# Patient Record
Sex: MALE | Race: WHITE | URBAN - METROPOLITAN AREA
[De-identification: names, ages, dates, MRNs, and addresses within clinical notes are randomized per-mention and may not be internally consistent; named-entity substitution may affect disease eponyms.]

---

## 2017-12-26 ENCOUNTER — OFFICE VISIT (OUTPATIENT)
Dept: URGENT CARE | Facility: URGENT CARE | Age: 57
End: 2017-12-26
Payer: COMMERCIAL

## 2017-12-26 DIAGNOSIS — R07.89 CHEST TIGHTNESS: Primary | ICD-10-CM

## 2017-12-26 DIAGNOSIS — I44.0 AV BLOCK, 1ST DEGREE: ICD-10-CM

## 2017-12-26 PROCEDURE — 93000 ELECTROCARDIOGRAM COMPLETE: CPT | Performed by: NURSE PRACTITIONER

## 2017-12-26 PROCEDURE — 99204 OFFICE O/P NEW MOD 45 MIN: CPT | Performed by: NURSE PRACTITIONER

## 2017-12-26 NOTE — MR AVS SNAPSHOT
After Visit Summary   12/26/2017    Osvaldo Mallory    MRN: [id number]           Patient Information     Date Of Birth          1960        Visit Information        Provider Department      12/26/2017 11:55 AM Gayle Sanchez NP [facility]        Today's Diagnoses     Chest tightness    -  1    AV block, 1st degree          Care Instructions      Understanding First-Degree Heart Block    Heart block is a condition in which the electrical system of the heart does not work properly. Sometimes it can result in a slow heartbeat that is either regular or irregular. This may cause symptoms. There are different types of heart block that can be more or less serious. First-degree heart block is a condition in which the wiring of the heart is slow to send electrical signals but all of the signals are able to pass successfully. There is no electrical block but rather a slowing or delay of the signal. It usually does not cause problems. Often it does not need treatment.  What causes first-degree heart block?  First-degree heart block may be caused by:    Natural aging process    Damage to the heart from surgery    Damage to the heart muscle from a heart attack    Other types of heart disease that damage the heart muscle    Low thyroid levels    Electrolyte abnormalities    Inflammatory or infectious heart conditions    Other diseases, including rheumatic fever and sarcoidosis    Some medicines  In addition, well-conditioned athletes may develop first-degree heart block from heart changes that result from exercising a lot. This is considered normal. Some babies are born with heart block. Heart block may also run in families.  What are the symptoms of first-degree heart block?  First-degree heart block often does not have any symptoms. It may be found when your healthcare provider is examining you for some other reason.  In more severe cases, people may have an uncomfortable awareness of the  heartbeat.   How is first-degree heart block treated?  First-degree heart block usually doesn t need treatment. Your healthcare provider may ask you to have regular follow-up visits. You may also be asked to take your own pulse and be alert to changes in your heart rate.  What are the  complications of first-degree heart block?  In rare instances, a first-degree heart block may develop into a more serious type of heart block that results in slower heartbeats. This may cause symptoms.  When should I call my healthcare provider?  Call your healthcare provider right away if you have any of these:    Unusual tiredness    Shortness of breath    Chest pain    Weakness, dizziness, or fainting    Unusual drowsiness or confusion    Pain that gets worse    Symptoms that don t get better with treatment, or symptoms that get worse    New symptoms   Date Last Reviewed: 5/1/2016 2000-2017 [employer]. [address] [place] [address]. All rights reserved. This information is not intended as a substitute for professional medical care. Always follow your healthcare professional's instructions.                Follow-ups after your visit        Additional Services     CARDIOLOGY EVAL ADULT REFERRAL       Your provider has referred you to:  [facility]: [facility] [phone]   [email or web address]    Please be aware that coverage of these services is subject to the terms and limitations of your health insurance plan.  Call member services at your health plan with any benefit or coverage questions.      Type of Referral:  New Cardiology Consult    Timeframe requested:  1-3 Days    Please bring the following to your appointment:  >>   Any x-rays, CTs or MRIs which have been performed.  Contact the facility where they were done to arrange for  prior to your scheduled appointment.   "  >>   List of current medications  >>   This referral request   >>   Any documents/labs given to you for this referral                  Who to contact     If you have questions or need follow up information about today's clinic visit or your schedule please contact [facility] [place] directly at [phone].  Normal or non-critical lab and imaging results will be communicated to you by MyChart, letter or phone within 4 business days after the clinic has received the results. If you do not hear from us within 7 days, please contact the clinic through MyChart or phone. If you have a critical or abnormal lab result, we will notify you by phone as soon as possible.  Submit refill requests through [employer] or call your pharmacy and they will forward the refill request to us. Please allow 3 business days for your refill to be completed.          Additional Information About Your Visit        MyChart Information     [employer] lets you send messages to your doctor, view your test results, renew your prescriptions, schedule appointments and more. To sign up, go to [email or web address] . Click on \"Log in\" on the left side of the screen, which will take you to the Welcome page. Then click on \"Sign up Now\" on the right side of the page.     You will be asked to enter the access code listed below, as well as some personal information. Please follow the directions to create your username and password.     Your access code is: [email or web address]  Expires: 3/26/2018  1:05 PM     Your access code will  in 90 days. If you need help or a new code, please call your [place] clinic or [phone].        Care EveryWhere ID     This is your Care EveryWhere ID. This could be used by other organizations to access your [place] medical records  [id number]         Blood Pressure from Last 3 Encounters:   No data found for BP    Weight from Last 3 Encounters:   No data found for Wt              We Performed the Following     " CARDIOLOGY EVAL ADULT REFERRAL     EKG 12-lead complete w/read - Clinics        Primary Care Provider Fax #    Physician No Ref-Primary [phone]       No address on file        Equal Access to Services     MARIBELL PEREIRA : Hadenrique aad ku haddestinyo Sokaylynali, wamansida luqadaha, qamichelleta kaalmada abel, lizeth venegas laángeljulianne mary ellen. So [facility] [phone].    ATENCIÓN: Si habla español, tiene a beaulieu disposición servicios gratuitos de asistencia lingüística. Llame al [phone].    We comply with applicable federal civil rights laws and [place] laws. We do not discriminate on the basis of race, color, national origin, age, disability, sex, sexual orientation, or gender identity.            Thank you!     Thank you for choosing [facility]  for your care. Our goal is always to provide you with excellent care. Hearing back from our patients is one way we can continue to improve our services. Please take a few minutes to complete the written survey that you may receive in the mail after your visit with us. Thank you!             Your Updated Medication List - Protect others around you: Learn how to safely use, store and throw away your medicines at www.disposemymeds.org.          This list is accurate as of: 12/26/17  1:05 PM.  Always use your most recent med list.                   Brand Name Dispense Instructions for use Diagnosis    ASPIRIN PO      Take 81 mg by mouth

## 2017-12-26 NOTE — PATIENT INSTRUCTIONS
Understanding First-Degree Heart Block    Heart block is a condition in which the electrical system of the heart does not work properly. Sometimes it can result in a slow heartbeat that is either regular or irregular. This may cause symptoms. There are different types of heart block that can be more or less serious. First-degree heart block is a condition in which the wiring of the heart is slow to send electrical signals but all of the signals are able to pass successfully. There is no electrical block but rather a slowing or delay of the signal. It usually does not cause problems. Often it does not need treatment.  What causes first-degree heart block?  First-degree heart block may be caused by:    Natural aging process    Damage to the heart from surgery    Damage to the heart muscle from a heart attack    Other types of heart disease that damage the heart muscle    Low thyroid levels    Electrolyte abnormalities    Inflammatory or infectious heart conditions    Other diseases, including rheumatic fever and sarcoidosis    Some medicines  In addition, well-conditioned athletes may develop first-degree heart block from heart changes that result from exercising a lot. This is considered normal. Some babies are born with heart block. Heart block may also run in families.  What are the symptoms of first-degree heart block?  First-degree heart block often does not have any symptoms. It may be found when your healthcare provider is examining you for some other reason.  In more severe cases, people may have an uncomfortable awareness of the heartbeat.   How is first-degree heart block treated?  First-degree heart block usually doesn t need treatment. Your healthcare provider may ask you to have regular follow-up visits. You may also be asked to take your own pulse and be alert to changes in your heart rate.  What are the  complications of first-degree heart block?  In rare instances, a first-degree heart block may  develop into a more serious type of heart block that results in slower heartbeats. This may cause symptoms.  When should I call my healthcare provider?  Call your healthcare provider right away if you have any of these:    Unusual tiredness    Shortness of breath    Chest pain    Weakness, dizziness, or fainting    Unusual drowsiness or confusion    Pain that gets worse    Symptoms that don t get better with treatment, or symptoms that get worse    New symptoms   Date Last Reviewed: 5/1/2016 2000-2017 [employer]. [address] [place] [id number]. All rights reserved. This information is not intended as a substitute for professional medical care. Always follow your healthcare professional's instructions.

## 2017-12-26 NOTE — PROGRESS NOTES
SUBJECTIVE:   Osvaldo Mallory is a 57 year old male who presents to clinic today for the following health issues:    Concern - Weakness and chest tightness  Onset: 4 days     Description:   Chest tightness, difficulties breathing and weakness    Intensity: moderate    Progression of Symptoms:  same and constant    Accompanying Signs & Symptoms:  extremely sleep     Previous history of similar problem:   No    Precipitating factors:   Worsened by: none    Alleviating factors:  Improved by: none    Therapies Tried and outcome: ibuprofen.        Problem list and histories reviewed & adjusted, as indicated.  Additional history: as documented    There is no problem list on file for this patient.    No past surgical history on file.    Social History   Substance Use Topics     Smoking status: Not on file     Smokeless tobacco: Not on file     Alcohol use Not on file     No family history on file.      Current Outpatient Prescriptions   Medication Sig Dispense Refill     ASPIRIN PO Take 81 mg by mouth       No Known Allergies      Reviewed and updated as needed this visit by clinical staff     Reviewed and updated as needed this visit by Provider         ROS:  C: NEGATIVE for fever, chills, change in weight  E/M: NEGATIVE for ear, mouth and throat problems  R: NEGATIVE for significant cough or SOB  CV: POSITIVE for chest tightness  MUSCULOSKELETAL: NEGATIVE for significant arthralgias or myalgia  NEURO: NEGATIVE for weakness, dizziness or paresthesias    OBJECTIVE:     There were no vitals taken for this visit.  There is no height or weight on file to calculate BMI.  GENERAL: healthy, alert and no distress  NECK: no adenopathy, no asymmetry, masses, or scars and thyroid normal to palpation  RESP: lungs clear to auscultation - no rales, rhonchi or wheezes  CV: regular rate and rhythm, normal S1 S2, no S3 or S4, no murmur, click or rub, no peripheral edema and peripheral pulses strong  ABDOMEN: soft, nontender, no  hepatosplenomegaly, no masses and bowel sounds normal  MS: no gross musculoskeletal defects noted, no edema    Diagnostic Test Results:  none   EKG - Normal Sinus Rhythm,with 1ST degree AV block    ASSESSMENT/PLAN:       ICD-10-CM    1. Chest tightness R07.89 EKG 12-lead complete w/read - Clinics     CARDIOLOGY EVAL ADULT REFERRAL   2. AV block, 1st degree I44.0 CARDIOLOGY EVAL ADULT REFERRAL     Patient reports feeling chest tightness and more sleepy and tired. This could be as a result of the first degree AV block.  No history of HTN, heart disease on him or his family. I strongly recommended visit to ER but patients declined. He would like to see cardiology instead. A referral is made. Osvaldo takes daily aspirin but has not taken today. I advised to take. I advised to to go tp ER if symptoms are getting worse. He agreed.    Gayle Sanchez NP  [facility]

## 2017-12-29 ENCOUNTER — PRE VISIT (OUTPATIENT)
Dept: CARDIOLOGY | Facility: CLINIC | Age: 57
End: 2017-12-29